# Patient Record
Sex: MALE | Race: WHITE | Employment: FULL TIME | URBAN - METROPOLITAN AREA
[De-identification: names, ages, dates, MRNs, and addresses within clinical notes are randomized per-mention and may not be internally consistent; named-entity substitution may affect disease eponyms.]

---

## 2021-04-16 ENCOUNTER — OFFICE VISIT (OUTPATIENT)
Dept: SURGERY | Age: 51
End: 2021-04-16
Payer: COMMERCIAL

## 2021-04-16 VITALS
WEIGHT: 260 LBS | HEART RATE: 90 BPM | TEMPERATURE: 98.2 F | HEIGHT: 72 IN | BODY MASS INDEX: 35.21 KG/M2 | DIASTOLIC BLOOD PRESSURE: 85 MMHG | SYSTOLIC BLOOD PRESSURE: 158 MMHG

## 2021-04-16 DIAGNOSIS — K42.9 UMBILICAL HERNIA WITHOUT OBSTRUCTION AND WITHOUT GANGRENE: Primary | ICD-10-CM

## 2021-04-16 DIAGNOSIS — Z12.11 ENCOUNTER FOR SCREENING COLONOSCOPY: ICD-10-CM

## 2021-04-16 DIAGNOSIS — Z72.0 CHEWING TOBACCO USE: ICD-10-CM

## 2021-04-16 PROCEDURE — 4004F PT TOBACCO SCREEN RCVD TLK: CPT | Performed by: SURGERY

## 2021-04-16 PROCEDURE — G8427 DOCREV CUR MEDS BY ELIG CLIN: HCPCS | Performed by: SURGERY

## 2021-04-16 PROCEDURE — 99202 OFFICE O/P NEW SF 15 MIN: CPT | Performed by: SURGERY

## 2021-04-16 PROCEDURE — 3017F COLORECTAL CA SCREEN DOC REV: CPT | Performed by: SURGERY

## 2021-04-16 PROCEDURE — G8417 CALC BMI ABV UP PARAM F/U: HCPCS | Performed by: SURGERY

## 2021-04-16 NOTE — LETTER
[facility] SURGERY Part of [place] [address]  Phone: [phone]  Fax: [phone]    Marialuisa Verma MD        April 18, 2021     [address]    Patient: Jeffery Allen  MR Number: [id number]  YOB: 1970  Date of Visit: 4/16/2021    Dear Dr. Alize Trammell: Thank you for the request for consultation for Katia Burroughs to me for the evaluation of umbilical hernia. Below are the relevant portions of my assessment and plan of care. ASSESSMENT     Diagnosis Orders   1. Umbilical hernia without obstruction and without gangrene     2. Encounter for screening colonoscopy     3. BMI 35.0-35.9,adult     4. Chewing tobacco use       PLAN    Discussed at length with  findings of umbilical hernia. We will proceed with umbilical herniorrhaphy, robotic assist, with mesh. Ideally, will plan screening colonoscopy prior to hernia repair. Risks, benefits, alternatives thoroughly reviewed and accepted by Mr. Sil Davila, including bleeding, infection, hernia recurrence, chronic pain, internal organ injury, COVID-19 exposure/infection, etc.  Additional risks of GI bleeding, perforation, missed lesions reviewed and accepted if he chooses to proceed with screening colonoscopy. Postoperative avoidance of abdominal straining and heavy lifting will be required for the first month after surgery. Patient conveys clear understanding and is in agreement. Discussed importance of tobacco cessation. If you have questions, please do not hesitate to call me. I look forward to following Fracisco Moore along with you.     Sincerely,        Marialuisa Verma MD

## 2021-04-18 ASSESSMENT — ENCOUNTER SYMPTOMS
SHORTNESS OF BREATH: 0
NAUSEA: 0
ABDOMINAL PAIN: 1
VOMITING: 0
TROUBLE SWALLOWING: 0
BACK PAIN: 0
SORE THROAT: 0
BLOOD IN STOOL: 0
CHOKING: 0
COUGH: 0

## 2021-04-18 NOTE — PATIENT INSTRUCTIONS
Patient Education        Hernia: Care Instructions  Your Care Instructions     A hernia develops when tissue bulges through a weak spot in the wall of your belly. The groin area and the navel are common areas for a hernia. A hernia can also develop near the area of a surgery you had before. Pressure from lifting, straining, or coughing can tear the weak area, causing the hernia to bulge and be painful. If you cannot push a hernia back into place, the tissue may become trapped outside the belly wall. If the hernia gets twisted and loses its blood supply, it will swell and die. This is called a strangulated hernia. It usually causes a lot of pain. It needs treatment right away. Some hernias need to be repaired to prevent a strangulated hernia. If your hernia causes symptoms or is large, you may need surgery. Follow-up care is a key part of your treatment and safety. Be sure to make and go to all appointments, and call your doctor if you are having problems. It's also a good idea to know your test results and keep a list of the medicines you take. How can you care for yourself at home? · Take care when lifting heavy objects. · Stay at a healthy weight. · Do not smoke. Smoking can cause coughing, which can cause your hernia to bulge. If you need help quitting, talk to your doctor about stop-smoking programs and medicines. These can increase your chances of quitting for good. · Talk with your doctor before wearing a corset or truss for a hernia. These devices are not recommended for treating hernias and sometimes can do more harm than good. There may be certain situations when your doctor thinks a truss would work, but these are rare. When should you call for help?    Call your doctor now or seek immediate medical care if:    · You have new or worse belly pain.     · You are vomiting.     · You cannot pass stools or gas.     · You cannot push the hernia back into place with gentle pressure when you are lying

## 2021-04-18 NOTE — PROGRESS NOTES
Vinny Logan MD  General Surgery, Endoscopy  Chief Medical Officer    [address] [address]  Dept: [phone]  Fax: 97 [address]la Cuello  Chief Complaint   Patient presents with    New Patient     umbilical hernia, patient states he noticed it about 4 months ago, denies pain. No known positve COVID, completed vaccination. HPI    Mr Thompson Chau is a pleasant 62-year-old white male kindly referred to me by Dr. Jada Farias for evaluation of umbilical hernia. He first noticed discomfort and bulging 4 months ago. No nausea, vomiting nor other signs of bowel obstruction. Prior left inguinal hernia repair in 2000. No previous umbilical hernia repair. No recent weight changes, 260 pounds, BMI 35. Normal appetite. Daily bowel movements, formed and brown, without blood. No prior abdominal surgery nor endoscopy. No cough, fever nor other respiratory symptoms. No history of COVID-19, vaccination is complete. No family history of colon cancer nor polyps to his knowledge. Patient has never smoked but uses chewing tobacco regularly. Review of Systems   Constitutional: Negative for activity change, appetite change, chills, fever and unexpected weight change. HENT: Negative for nosebleeds, sneezing, sore throat and trouble swallowing. Eyes: Negative for visual disturbance. Respiratory: Negative for cough, choking and shortness of breath. Cardiovascular: Negative for chest pain, palpitations and leg swelling. Gastrointestinal: Positive for abdominal pain (umbilical hernia site). Negative for blood in stool, nausea and vomiting. Genitourinary: Negative for dysuria, flank pain and hematuria. Musculoskeletal: Negative for back pain, gait problem and myalgias. Allergic/Immunologic: Negative for immunocompromised state. Neurological: Negative for dizziness, seizures, syncope, weakness and headaches.    Hematological: Does not bruise/bleed easily. Psychiatric/Behavioral: Negative for confusion and sleep disturbance. Past Medical History:   Diagnosis Date    Impaired fasting glucose     Mixed hyperlipidemia        Past Surgical History:   Procedure Laterality Date    HERNIA REPAIR Left 2000    INGUINAL    TONSILLECTOMY AND ADENOIDECTOMY      TYMPANOSTOMY TUBE PLACEMENT      VASECTOMY         No family history on file. Allergies:  See list    Current Outpatient Medications   Medication Sig Dispense Refill    diclofenac (VOLTAREN) 50 MG EC tablet Take 1 tablet by mouth daily as needed (migraine headache) 10 tablet 0    atorvastatin (LIPITOR) 40 MG tablet TAKE ONE TABLET BY MOUTH DAILY 90 tablet 1    fenofibrate (TRIGLIDE) 160 MG tablet Take 1 tablet by mouth daily 90 tablet 3    aspirin EC 81 MG EC tablet Take 1 tablet by mouth daily 1 tablet 0    tadalafil (CIALIS) 10 MG tablet Take 1 tablet by mouth daily as needed for Erectile Dysfunction 30 tablet 11     No current facility-administered medications for this visit.         Social History     Socioeconomic History    Marital status:      Spouse name: Not on file    Number of children: Not on file    Years of education: Not on file    Highest education level: Not on file   Occupational History    Not on file   Social Needs    Financial resource strain: Not hard at all    Food insecurity     Worry: Never true     Inability: Never true   [employer] needs     Medical: No     Non-medical: No   Tobacco Use    Smoking status: Never Smoker    Smokeless tobacco: Current User   Substance and Sexual Activity    Alcohol use: Not on file    Drug use: Not on file    Sexual activity: Not on file   Lifestyle    Physical activity     Days per week: Not on file     Minutes per session: Not on file    Stress: Not on file   Relationships    Social connections     Talks on phone: Not on file     Gets together: Not on file     Attends Christianity service: Not on file     Active member of club or organization: Not on file     Attends meetings of clubs or organizations: Not on file     Relationship status: Not on file    Intimate partner violence     Fear of current or ex partner: Not on file     Emotionally abused: Not on file     Physically abused: Not on file     Forced sexual activity: Not on file   Other Topics Concern    Not on file   Social History Narrative    Not on file       BP (!) 158/85   Pulse 90   Temp 98.2 °F (36.8 °C)   Ht 6' (1.829 m)   Wt 260 lb (117.9 kg)   BMI 35.26 kg/m²      Physical Exam  Vitals signs and nursing note reviewed. Constitutional:       Appearance: He is well-developed. HENT:      Head: Normocephalic and atraumatic. Eyes:      General: No scleral icterus. Conjunctiva/sclera: Conjunctivae normal.      Pupils: Pupils are equal, round, and reactive to light. Neck:      Musculoskeletal: Normal range of motion and neck supple. Vascular: No JVD. Trachea: No tracheal deviation. Cardiovascular:      Rate and Rhythm: Normal rate and regular rhythm. Pulmonary:      Effort: Pulmonary effort is normal. No respiratory distress. Chest:      Chest wall: No tenderness. Abdominal:      General: There is no distension. Palpations: Abdomen is soft. There is no mass. Tenderness: There is no abdominal tenderness. There is no guarding or rebound. Hernia: A hernia (umbilical) is present. Musculoskeletal: Normal range of motion. Lymphadenopathy:      Cervical: No cervical adenopathy. Skin:     General: Skin is warm and dry. Findings: No erythema or rash. Neurological:      Mental Status: He is alert and oriented to person, place, and time. Cranial Nerves: No cranial nerve deficit. Psychiatric:         Behavior: Behavior normal.         Thought Content:  Thought content normal.         Judgment: Judgment normal.         IMAGING/LABS       Hemoglobin A1C (Order [id number])  12/18/2020  5:51 PM - [facility], Lab [facility]    Component Value Ref Range & Units Status Collected Lab   Hemoglobin A1C 5.8  4.4 - 6.4 % Final 12/18/2020  7:50 AM [facility]   NOTE                    ADA Guidelines            Result                  HgbA1c         ------------           ---------------         Normal :               less than 5.7 %         Prediabetes :          5.7 %  to 6.4 %         Diabetes :             > 6.4 %   Use with caution in patients with abnormal hemoglobin variants as   the half-life of red blood cells and in vivo glycation rates are   affected. AVERAGE GLUCOSE 120  mg/dL Final 12/18/2020  7:50 AM [facility]   Performed at [address] [place] [address] Lab   [address] [facility] [address]   Pathology [address] [facility], [address] [facility]   CLIA No. [id number]   CAP Accreditation No. [id number]   : Emmie Marie. Romie Negron M.D. Testing Performed By    Lab - Abbreviation Name Director Address Valid Date Range   275[facility] Unknown Unknown 07/15/14 1010-Present   Narrative  Performed by: [facility]  Ordering Provider: Travon Curiel   Lab and Collection    Hemoglobin A1C - 12/18/2020  Result History    ASSESSMENT     Diagnosis Orders   1. Umbilical hernia without obstruction and without gangrene     2. Encounter for screening colonoscopy     3. BMI 35.0-35.9,adult     4. Chewing tobacco use       PLAN    Discussed at length with  findings of umbilical hernia. We will proceed with umbilical herniorrhaphy, robotic assist, with mesh. Ideally, will plan screening colonoscopy prior to hernia repair. Risks, benefits, alternatives thoroughly reviewed and accepted by Mr. Rudolfo Claude, including bleeding, infection, hernia recurrence, chronic pain, internal organ injury, COVID-19 exposure/infection, etc.  Additional risks of GI bleeding, perforation, missed lesions reviewed and accepted if he chooses to proceed with screening colonoscopy. Postoperative avoidance of abdominal straining and heavy lifting will be required for the first month after surgery. Patient conveys clear understanding and is in agreement. Discussed importance of tobacco cessation.      Mushtaq Rico MD

## 2021-04-26 ENCOUNTER — TELEPHONE (OUTPATIENT)
Dept: SURGERY | Age: 51
End: 2021-04-26

## 2021-04-26 NOTE — TELEPHONE ENCOUNTER
Colonoscopy canceled from 06/05/97 and umbilical hernia repair canceled from 06/23/21.  Both were scheduled with Dr Catina Millard and [facility].

## 2021-06-02 ENCOUNTER — TELEPHONE (OUTPATIENT)
Dept: SURGERY | Age: 51
End: 2021-06-02

## 2021-06-02 NOTE — TELEPHONE ENCOUNTER
Patient called stating he finally got his new insurance and would like to be rescheduled for surgery.  He can be reached at [phone]

## 2021-06-08 RX ORDER — SODIUM, POTASSIUM,MAG SULFATES 17.5-3.13G
1 SOLUTION, RECONSTITUTED, ORAL ORAL ONCE
Qty: 1 KIT | Refills: 0 | Status: SHIPPED | OUTPATIENT
Start: 2021-06-08 | End: 2021-06-08

## 2021-06-17 NOTE — PROGRESS NOTES
Patient instructed on the pre-operative, intra-operative, and post-operative process. Patient instructed on NPO status. Medication instructions and Pre operative instruction sheet reviewed over the phone. CHG skin prep instructions reviewed with the patient. Pt states he was instructed to stop taking aspirin 7 days prior to surgery. Pt states he will   e- mail a copy of his covid vaccination card to [facility] for proof of vaccination.

## 2021-06-22 ENCOUNTER — HOSPITAL ENCOUNTER (OUTPATIENT)
Age: 51
Discharge: HOME | End: 2021-06-22
Payer: COMMERCIAL

## 2021-06-22 DIAGNOSIS — Z01.818 PRE-OP TESTING: ICD-10-CM

## 2021-06-22 LAB
EKG ATRIAL RATE: 74 BPM
EKG P AXIS: 16 DEGREES
EKG P-R INTERVAL: 178 MS
EKG Q-T INTERVAL: 394 MS
EKG QRS DURATION: 90 MS
EKG QTC CALCULATION (BAZETT): 437 MS
EKG R AXIS: -8 DEGREES
EKG T AXIS: 12 DEGREES
EKG VENTRICULAR RATE: 74 BPM

## 2021-06-22 NOTE — PROGRESS NOTES
[facility]   Preadmission Testing    Name: Jr Ruby  : 1970  Patient Phone: [phone] (home)     Procedure: Colonoscopy   Date of Procedure: 21  Surgeon: Mina Acosta MD    Ht:    Wt:   Wt method: Allergies: Allergies   Allergen Reactions    Strawberry Extract            Latex Allergy Screening Tool  Have you ever had a reaction to or been told by a physician that you have an allergy to latex or natural rubber?: No    There were no vitals filed for this visit. No LMP for male patient. Do you take blood thinners? [] Yes    [x] No         Instructed to stop blood thinners prior to procedure? [] Yes    [] No      [x] N/A   Do you have sleep apnea? [] Yes    [x] No     Do you have acid reflux ? [] Yes    [x] No     Do you have  hiatal hernia? [] Yes    [x] No    Do you ever experience motion sickness? [] Yes    [x] No     Have you had a respiratory infection or sore throat in last 4 weeks before surgery? [] Yes    [x] No     Do you have poorly controlled asthma or COPD? Difficulty with intubation in past? [] Yes    [x] No      [] Yes    [x] No       Do you have a history of angina in the last month or symptomatic arrhythmia? [] Yes    [x] No     Do you have significant central nervous system disease? [] Yes    [] No     Have you had an EKG, labs, or chest xray in last 12 months? If yes provide copies to anesthesia   [x] Yes    [] No       [] Lab    [x] EKG    [] CXR     Have you had a stress test?     [] Yes    [x] No    When/where:    Was it normal?    [] Yes    [] No     Do you or your family have a history of Malignant Hyperthermia? [] Yes    [x] No           Do you smoke? [] Yes    [x] No      Please refrain from smoking on the day of surgery.       Patient instructed on: [x] NPO Status   [x] Meds to Take  [x] Ride Home  [x]No Jewelry/Contact Lenses/Nail [place]  [x] Prep/Lax/Clear Liquids    [] Chlorhexidene     DOS Patient Needs [] HCG [] Blood Sugar  [] PT/INR    [] T&S       COVID Vaccinated? [x] Yes    [] No                     Patient instructed on the pre-operative, intra-operative, and post-operative process? Yes  Medication instructions reviewed with patient?   Yes

## 2021-06-24 ENCOUNTER — ANESTHESIA (OUTPATIENT)
Dept: ENDOSCOPY | Age: 51
End: 2021-06-24
Payer: COMMERCIAL

## 2021-06-24 ENCOUNTER — ANESTHESIA EVENT (OUTPATIENT)
Dept: ENDOSCOPY | Age: 51
End: 2021-06-24
Payer: COMMERCIAL

## 2021-06-24 ENCOUNTER — HOSPITAL ENCOUNTER (OUTPATIENT)
Age: 51
Setting detail: OUTPATIENT SURGERY
Discharge: HOME | End: 2021-06-24
Attending: SURGERY | Admitting: SURGERY
Payer: COMMERCIAL

## 2021-06-24 VITALS
WEIGHT: 254.8 LBS | HEART RATE: 61 BPM | RESPIRATION RATE: 20 BRPM | TEMPERATURE: 98.9 F | DIASTOLIC BLOOD PRESSURE: 87 MMHG | HEIGHT: 72 IN | BODY MASS INDEX: 34.51 KG/M2 | SYSTOLIC BLOOD PRESSURE: 151 MMHG | OXYGEN SATURATION: 99 %

## 2021-06-24 VITALS
DIASTOLIC BLOOD PRESSURE: 71 MMHG | RESPIRATION RATE: 14 BRPM | OXYGEN SATURATION: 96 % | SYSTOLIC BLOOD PRESSURE: 109 MMHG

## 2021-06-24 DIAGNOSIS — Z01.818 PRE-OP TESTING: Primary | ICD-10-CM

## 2021-06-24 PROBLEM — Z12.11 ENCOUNTER FOR SCREENING COLONOSCOPY: Status: ACTIVE | Noted: 2021-06-24

## 2021-06-24 PROCEDURE — 6360000002 HC RX W HCPCS: Performed by: NURSE ANESTHETIST, CERTIFIED REGISTERED

## 2021-06-24 PROCEDURE — 3609027000 HC COLONOSCOPY: Performed by: SURGERY

## 2021-06-24 PROCEDURE — 7100000010 HC PHASE II RECOVERY - FIRST 15 MIN: Performed by: SURGERY

## 2021-06-24 PROCEDURE — 2709999900 HC NON-CHARGEABLE SUPPLY: Performed by: SURGERY

## 2021-06-24 PROCEDURE — 2500000003 HC RX 250 WO HCPCS: Performed by: NURSE ANESTHETIST, CERTIFIED REGISTERED

## 2021-06-24 PROCEDURE — 7100000011 HC PHASE II RECOVERY - ADDTL 15 MIN: Performed by: SURGERY

## 2021-06-24 PROCEDURE — 3700000000 HC ANESTHESIA ATTENDED CARE: Performed by: SURGERY

## 2021-06-24 PROCEDURE — 2580000003 HC RX 258: Performed by: SURGERY

## 2021-06-24 PROCEDURE — 3700000001 HC ADD 15 MINUTES (ANESTHESIA): Performed by: SURGERY

## 2021-06-24 RX ORDER — PROPOFOL 10 MG/ML
INJECTION, EMULSION INTRAVENOUS PRN
Status: DISCONTINUED | OUTPATIENT
Start: 2021-06-24 | End: 2021-06-24 | Stop reason: SDUPTHER

## 2021-06-24 RX ORDER — SODIUM CHLORIDE 0.9 % (FLUSH) 0.9 %
5-40 SYRINGE (ML) INJECTION EVERY 12 HOURS SCHEDULED
Status: DISCONTINUED | OUTPATIENT
Start: 2021-06-24 | End: 2021-06-24 | Stop reason: HOSPADM

## 2021-06-24 RX ORDER — SODIUM CHLORIDE 0.9 % (FLUSH) 0.9 %
5-40 SYRINGE (ML) INJECTION PRN
Status: DISCONTINUED | OUTPATIENT
Start: 2021-06-24 | End: 2021-06-24 | Stop reason: HOSPADM

## 2021-06-24 RX ORDER — SODIUM CHLORIDE, SODIUM LACTATE, POTASSIUM CHLORIDE, CALCIUM CHLORIDE 600; 310; 30; 20 MG/100ML; MG/100ML; MG/100ML; MG/100ML
INJECTION, SOLUTION INTRAVENOUS CONTINUOUS
Status: CANCELLED | OUTPATIENT
Start: 2021-06-24

## 2021-06-24 RX ORDER — SODIUM CHLORIDE 0.9 % (FLUSH) 0.9 %
10 SYRINGE (ML) INJECTION PRN
Status: CANCELLED | OUTPATIENT
Start: 2021-06-24

## 2021-06-24 RX ORDER — LIDOCAINE HYDROCHLORIDE 10 MG/ML
INJECTION, SOLUTION EPIDURAL; INFILTRATION; INTRACAUDAL; PERINEURAL PRN
Status: DISCONTINUED | OUTPATIENT
Start: 2021-06-24 | End: 2021-06-24 | Stop reason: SDUPTHER

## 2021-06-24 RX ORDER — SODIUM CHLORIDE, SODIUM LACTATE, POTASSIUM CHLORIDE, CALCIUM CHLORIDE 600; 310; 30; 20 MG/100ML; MG/100ML; MG/100ML; MG/100ML
INJECTION, SOLUTION INTRAVENOUS CONTINUOUS
Status: DISCONTINUED | OUTPATIENT
Start: 2021-06-24 | End: 2021-06-24 | Stop reason: HOSPADM

## 2021-06-24 RX ORDER — SODIUM CHLORIDE 0.9 % (FLUSH) 0.9 %
10 SYRINGE (ML) INJECTION EVERY 12 HOURS SCHEDULED
Status: CANCELLED | OUTPATIENT
Start: 2021-06-24

## 2021-06-24 RX ORDER — SODIUM CHLORIDE 9 MG/ML
25 INJECTION, SOLUTION INTRAVENOUS PRN
Status: CANCELLED | OUTPATIENT
Start: 2021-06-24

## 2021-06-24 RX ORDER — PROPOFOL 10 MG/ML
INJECTION, EMULSION INTRAVENOUS CONTINUOUS PRN
Status: DISCONTINUED | OUTPATIENT
Start: 2021-06-24 | End: 2021-06-24 | Stop reason: SDUPTHER

## 2021-06-24 RX ORDER — SODIUM CHLORIDE 9 MG/ML
25 INJECTION, SOLUTION INTRAVENOUS PRN
Status: DISCONTINUED | OUTPATIENT
Start: 2021-06-24 | End: 2021-06-24 | Stop reason: HOSPADM

## 2021-06-24 RX ADMIN — SODIUM CHLORIDE, POTASSIUM CHLORIDE, SODIUM LACTATE AND CALCIUM CHLORIDE: 600; 310; 30; 20 INJECTION, SOLUTION INTRAVENOUS at 11:49

## 2021-06-24 RX ADMIN — PROPOFOL 100 MCG/KG/MIN: 10 INJECTION, EMULSION INTRAVENOUS at 14:09

## 2021-06-24 RX ADMIN — PROPOFOL 100 MG: 10 INJECTION, EMULSION INTRAVENOUS at 14:08

## 2021-06-24 RX ADMIN — LIDOCAINE HYDROCHLORIDE 100 MG: 10 INJECTION, SOLUTION EPIDURAL; INFILTRATION; INTRACAUDAL; PERINEURAL at 14:09

## 2021-06-24 RX ADMIN — PROPOFOL 50 MG: 10 INJECTION, EMULSION INTRAVENOUS at 14:09

## 2021-06-24 ASSESSMENT — PAIN - FUNCTIONAL ASSESSMENT: PAIN_FUNCTIONAL_ASSESSMENT: 0-10

## 2021-06-24 ASSESSMENT — PAIN SCALES - GENERAL
PAINLEVEL_OUTOF10: 0
PAINLEVEL_OUTOF10: 0

## 2021-06-24 NOTE — OP NOTE
[place] [address]                                OPERATIVE REPORT    PATIENT NAME: Ryder Clayton                 :        1970  MED REC NO:   [id number]                              ROOM:  ACCOUNT NO:   [de-identified]                           ADMIT DATE: 2021  PROVIDER:     Miguel Valladares    DATE OF PROCEDURE:  2021    ATTENDING SURGEON:  Miguel Valladares MD    MEDICAL ATTENDING:  Hussein Ortega MD    PREOPERATIVE DIAGNOSIS:  Screening colonoscopy. POSTOPERATIVE DIAGNOSIS:  Normal colon. OPERATION:  Colonoscopy, anus to cecum. ANESTHESIA:  MAC.    ESTIMATED BLOOD LOSS:  None. INDICATIONS: The patient is a pleasant 69-year-old white male presenting  for screening colonoscopy. He is also preoped for umbilical hernia  repair. He has had no previous GI surgery. No prior endoscopy. BMI of  35. No family history of colon cancer nor polyps to his knowledge. The  patient has never smoked tobacco, but uses chewing tobacco regularly. At this time, screening colonoscopy is indicated. OPERATIVE PROCEDURE:  After obtaining informed consent with discussion  of risks, benefits, and alternatives including a risk of GI bleeding,  perforation, missed lesions, COVID-19 exposure/infection, etc., the  patient was taken to the endoscopy suite and placed in the left lateral  recumbent position. Following adequate IV sedation, a digital rectal  exam was performed. Sphincter tone was normal.  Prostate was  unremarkable. There were no discrete masses. A colonoscope was passed  transanally into the rectum and advanced with gentle insufflation  throughout the entirety of the colon to the cecum.   Cecal position was  confirmed by clear visualization of the ileocecal valve, the appendiceal  orifice, and transduction of manual pressure in the right lower quadrant  to the cecum.  The bowel prep was excellent. All colonic mucosa was  clearly visible. The cecum, ascending colon, and hepatic flexure were  normal.  Transverse colon, splenic flexure were also normal.  The  descending colon and sigmoid were normal.  Upper, middle, and lower  portions of the rectum were normal.  On retroflexion, there were minimal  grade 1 to grade 2 internal hemorrhoids. The colon was decompressed by  suction as the scope was removed. No biopsies were required. The  patient tolerated the procedure well and was transferred to PACU in  stable condition. SPECIMENS:  None. DRAINS:  None. COMPLICATIONS:  None. DISPOSITION:  To PACU, awake, alert, and stable. Following recovery, we  will discharge the patient home with gradual advancement of diet and  activity as tolerated with instructions for a healthy, balanced,  high-fiber, low-fat diet with fiber supplementation. We will encourage  tobacco cessation. Follow up will be with me after laparoscopic  umbilical hernia repair, robotic assist.  We will recommend next  screening colonoscopy in 10 years age 63 since he has no personal  history of polyps and no family history of colon cancer nor polyps to  his knowledge.         LICO Pham    D: 06/24/2021 14:27:32       T: 06/24/2021 14:35:20     [email or web address]  Job#: [id number]     Doc#: [id number]    CC:  Lalito Mathews

## 2021-06-24 NOTE — ANESTHESIA PRE PROCEDURE
History:        Procedure Laterality Date    HERNIA REPAIR Left 2000    INGUINAL    TONSILLECTOMY AND ADENOIDECTOMY      TYMPANOSTOMY TUBE PLACEMENT      VASECTOMY         Social History:    Social History     Tobacco Use    Smoking status: Never Smoker    Smokeless tobacco: Current User     Types: Chew   Substance Use Topics    Alcohol use: Yes     Comment: RARE                                Ready to quit: Not Answered  Counseling given: Not Answered      Vital Signs (Current):   Vitals:    06/24/21 1129 06/24/21 1139   BP:  137/73   Pulse:  56   Resp:  18   Temp:  37.1 °C (98.7 °F)   TempSrc:  Temporal   SpO2:  95%   Weight: 254 lb 12.8 oz (115.6 kg)    Height: 6' (1.829 m)                                               BP Readings from Last 3 Encounters:   06/24/21 137/73   04/16/21 (!) 158/85   12/20/19 131/85       NPO Status: Time of last liquid consumption: 2300                        Time of last solid consumption: 1800                        Date of last liquid consumption: 06/24/21                        Date of last solid food consumption: 06/22/21    BMI:   Wt Readings from Last 3 Encounters:   06/24/21 254 lb 12.8 oz (115.6 kg)   04/16/21 260 lb (117.9 kg)   12/20/19 265 lb (120.2 kg)     Body mass index is 34.56 kg/m². CBC:   Lab Results   Component Value Date    WBC 7.0 12/18/2020    RBC 4.64 12/18/2020    HGB 14.6 12/18/2020    HCT 43.1 12/18/2020    MCV 93 12/18/2020    RDW 13.4 12/18/2020     12/18/2020       CMP:   Lab Results   Component Value Date     12/18/2020    K 4.1 12/18/2020     12/18/2020    CO2 23 12/18/2020    BUN 18 12/18/2020    CREATININE 1.01 12/18/2020    GLUCOSE 103 12/18/2020    PROT 7.0 06/12/2020    CALCIUM 9.1 12/18/2020    BILITOT 0.6 06/12/2020    ALKPHOS 36 06/12/2020    AST 33 12/18/2020    ALT 55 12/18/2020       POC Tests: No results for input(s): POCGLU, POCNA, POCK, POCCL, POCBUN, POCHEMO, POCHCT in the last 72 hours.     Coags: No results

## 2021-06-24 NOTE — ANESTHESIA POSTPROCEDURE EVALUATION
Department of Anesthesiology  Postprocedure Note    Patient: Carie Quiles  MRN: [id number]  YOB: 1970  Date of evaluation: 6/24/2021  Time:  2:30 PM     Procedure Summary     Date: 06/24/21 Room / Location: [facility] ENDOSCOPY    Anesthesia Start: 3676 Anesthesia Stop: 6714    Procedure: COLONOSCOPY (N/A Abdomen) Diagnosis: (SCREENING COLONOSCOPY)    Surgeons: Carline Issa MD Responsible Provider: REINA Oquendo CRNA    Anesthesia Type: MAC ASA Status: 2          Anesthesia Type: MAC    Pk Phase I: Pk Score: 10    Pk Phase II:      Last vitals: Reviewed and per EMR flowsheets.        Anesthesia Post Evaluation    Patient location during evaluation: PACU  Patient participation: complete - patient participated  Level of consciousness: awake and alert  Pain score: 0  Airway patency: patent  Nausea & Vomiting: no nausea and no vomiting  Complications: no  Cardiovascular status: blood pressure returned to baseline and hemodynamically stable  Respiratory status: acceptable, room air and spontaneous ventilation  Hydration status: euvolemic

## 2021-06-24 NOTE — PROGRESS NOTES

## 2021-06-24 NOTE — BRIEF OP NOTE
Brief Postoperative Note      Patient: Dayanna Hagen  YOB: 1970  MRN: [id number]    Date of Procedure: 6/24/2021    Pre-Op Diagnosis:      1. Screening colonoscopy     Post-Op Diagnosis:      1. Normal colon       Operation:     1. Colonoscopy anus to cecum    Surgeon(s):  Mushtaq Rico MD    Assistant:  * No surgical staff found *    Anesthesia: Monitor Anesthesia Care    Estimated Blood Loss (mL):  None    Complications: None    Specimens:  None    Findings:  As above.     Dictated # [id number]    Electronically signed by Mushtaq Rico MD on 6/24/2021 at 2:22 PM

## 2021-06-24 NOTE — H&P
HPI     Mr Goldy Jennings is a pleasant 58-year-old white male kindly referred to me by Dr. Darrell Mcknight for evaluation of umbilical hernia. He first noticed discomfort and bulging 4 months ago. No nausea, vomiting nor other signs of bowel obstruction. Prior left inguinal hernia repair in 2000. No previous umbilical hernia repair. No recent weight changes, 260 pounds, BMI 35. Normal appetite. Daily bowel movements, formed and brown, without blood. No prior abdominal surgery nor endoscopy. No cough, fever nor other respiratory symptoms. No history of COVID-19, vaccination is complete. No family history of colon cancer nor polyps to his knowledge. Patient has never smoked but uses chewing tobacco regularly.     Review of Systems   Constitutional: Negative for activity change, appetite change, chills, fever and unexpected weight change. HENT: Negative for nosebleeds, sneezing, sore throat and trouble swallowing. Eyes: Negative for visual disturbance. Respiratory: Negative for cough, choking and shortness of breath. Cardiovascular: Negative for chest pain, palpitations and leg swelling. Gastrointestinal: Positive for abdominal pain (umbilical hernia site). Negative for blood in stool, nausea and vomiting. Genitourinary: Negative for dysuria, flank pain and hematuria. Musculoskeletal: Negative for back pain, gait problem and myalgias. Allergic/Immunologic: Negative for immunocompromised state. Neurological: Negative for dizziness, seizures, syncope, weakness and headaches. Hematological: Does not bruise/bleed easily.    Psychiatric/Behavioral: Negative for confusion and sleep disturbance.         Past Medical History        Past Medical History:   Diagnosis Date    Impaired fasting glucose      Mixed hyperlipidemia              Past Surgical History         Past Surgical History:   Procedure Laterality Date    HERNIA REPAIR Left 2000     INGUINAL    TONSILLECTOMY AND ADENOIDECTOMY        TYMPANOSTOMY TUBE PLACEMENT        VASECTOMY                Family History   No family history on file.        Allergies:  See list     Current Facility-Administered Medications          Current Outpatient Medications   Medication Sig Dispense Refill    diclofenac (VOLTAREN) 50 MG EC tablet Take 1 tablet by mouth daily as needed (migraine headache) 10 tablet 0    atorvastatin (LIPITOR) 40 MG tablet TAKE ONE TABLET BY MOUTH DAILY 90 tablet 1    fenofibrate (TRIGLIDE) 160 MG tablet Take 1 tablet by mouth daily 90 tablet 3    aspirin EC 81 MG EC tablet Take 1 tablet by mouth daily 1 tablet 0    tadalafil (CIALIS) 10 MG tablet Take 1 tablet by mouth daily as needed for Erectile Dysfunction 30 tablet 11      No current facility-administered medications for this visit.             Social History   Social History            Socioeconomic History    Marital status:        Spouse name: Not on file    Number of children: Not on file    Years of education: Not on file    Highest education level: Not on file   Occupational History    Not on file   Social Needs    Financial resource strain: Not hard at all   [employer]-Delia insecurity       Worry: Never true       Inability: Never true    Transportation needs       Medical: No       Non-medical: No   Tobacco Use    Smoking status: Never Smoker    Smokeless tobacco: Current User   Substance and Sexual Activity    Alcohol use: Not on file    Drug use: Not on file    Sexual activity: Not on file   Lifestyle    Physical activity       Days per week: Not on file       Minutes per session: Not on file    Stress: Not on file   Relationships    Social connections       Talks on phone: Not on file       Gets together: Not on file       Attends Jain service: Not on file       Active member of club or organization: Not on file       Attends meetings of clubs or organizations: Not on file       Relationship status: Not on file    Intimate partner violence       Fear of current or ex partner: Not on file       Emotionally abused: Not on file       Physically abused: Not on file       Forced sexual activity: Not on file   Other Topics Concern    Not on file   Social History Narrative    Not on file            BP (!) 158/85   Pulse 90   Temp 98.2 °F (36.8 °C)   Ht 6' (1.829 m)   Wt 260 lb (117.9 kg)   BMI 35.26 kg/m²       Physical Exam  Vitals signs and nursing note reviewed. Constitutional:       Appearance: He is well-developed. HENT:      Head: Normocephalic and atraumatic. Eyes:      General: No scleral icterus. Conjunctiva/sclera: Conjunctivae normal.      Pupils: Pupils are equal, round, and reactive to light. Neck:      Musculoskeletal: Normal range of motion and neck supple. Vascular: No JVD. Trachea: No tracheal deviation. Cardiovascular:      Rate and Rhythm: Normal rate and regular rhythm. Pulmonary:      Effort: Pulmonary effort is normal. No respiratory distress. Chest:      Chest wall: No tenderness. Abdominal:      General: There is no distension. Palpations: Abdomen is soft. There is no mass. Tenderness: There is no abdominal tenderness. There is no guarding or rebound. Hernia: A hernia (umbilical) is present. Musculoskeletal: Normal range of motion. Lymphadenopathy:      Cervical: No cervical adenopathy. Skin:     General: Skin is warm and dry. Findings: No erythema or rash. Neurological:      Mental Status: He is alert and oriented to person, place, and time. Cranial Nerves: No cranial nerve deficit. Psychiatric:         Behavior: Behavior normal.         Thought Content:  Thought content normal.         Judgment: Judgment normal.            IMAGING/LABS        Hemoglobin A1C (Order [id number])  12/18/2020  5:51 PM - [place], Lab [facility]     Component Value Ref Range & Units Status Collected Lab   Hemoglobin A1C 5.8  4.4 - 6.4 % Final 12/18/2020  7:50 AM [facility]   NOTE                    ADA Guidelines            Result                  HgbA1c         ------------           ---------------         Normal :               less than 5.7 %         Prediabetes :          5.7 %  to 6.4 %         Diabetes :             > 6.4 %   Use with caution in patients with abnormal hemoglobin variants as   the half-life of red blood cells and in vivo glycation rates are   affected. AVERAGE GLUCOSE 120  mg/dL Final 12/18/2020  7:50 AM [facility]   Performed at [facility]. [facility]   [address] [facility] [address]   Pathology [facility], [address] [place] [address]   CLIA No. [id number]   CAP Accreditation No. [id number]   : Kahlil Reece. Allyn Keys M.D. Testing Performed By     Lab - Abbreviation Name Director Address Valid Date Range   [facility] Unknown Unknown 07/15/14 1010-Present   Narrative  Performed by: [facility]  Ordering Provider: Ronaldo Cash and Collection     Hemoglobin A1C - 12/18/2020  Result History     ASSESSMENT       Diagnosis Orders   1. Umbilical hernia without obstruction and without gangrene      2. Encounter for screening colonoscopy      3. BMI 35.0-35.9,adult      4. Chewing tobacco use         PLAN     Previously discussed at length with  findings of umbilical hernia. We will proceed with umbilical herniorrhaphy, robotic assist, with mesh after colonoscopy. Risks, benefits, alternatives thoroughly reviewed and accepted by Mr. Pamella Galindo, including bleeding, infection, hernia recurrence, chronic pain, internal organ injury, COVID-19 exposure/infection, etc.  Additional risks of GI bleeding, perforation, missed lesions reviewed and accepted if he chooses to proceed with screening colonoscopy today. Postoperative avoidance of abdominal straining and heavy lifting will be required for the first month after surgery. Patient conveys clear understanding and is in agreement.   Discussed importance of tobacco cessation.

## 2021-06-29 ENCOUNTER — ANESTHESIA EVENT (OUTPATIENT)
Dept: OPERATING ROOM | Age: 51
End: 2021-06-29
Payer: COMMERCIAL

## 2021-06-30 ENCOUNTER — ANESTHESIA (OUTPATIENT)
Dept: OPERATING ROOM | Age: 51
End: 2021-06-30
Payer: COMMERCIAL

## 2021-06-30 ENCOUNTER — HOSPITAL ENCOUNTER (OUTPATIENT)
Age: 51
Setting detail: OUTPATIENT SURGERY
Discharge: HOME | End: 2021-06-30
Attending: SURGERY | Admitting: SURGERY
Payer: COMMERCIAL

## 2021-06-30 VITALS
WEIGHT: 241 LBS | BODY MASS INDEX: 32.64 KG/M2 | TEMPERATURE: 97.3 F | OXYGEN SATURATION: 96 % | SYSTOLIC BLOOD PRESSURE: 130 MMHG | DIASTOLIC BLOOD PRESSURE: 83 MMHG | HEIGHT: 72 IN | RESPIRATION RATE: 16 BRPM | HEART RATE: 69 BPM

## 2021-06-30 VITALS — DIASTOLIC BLOOD PRESSURE: 74 MMHG | SYSTOLIC BLOOD PRESSURE: 138 MMHG | OXYGEN SATURATION: 98 %

## 2021-06-30 DIAGNOSIS — Z87.19 S/P LAPAROSCOPIC HERNIA REPAIR: Primary | ICD-10-CM

## 2021-06-30 DIAGNOSIS — Z98.890 S/P LAPAROSCOPIC HERNIA REPAIR: Primary | ICD-10-CM

## 2021-06-30 PROBLEM — K42.9 UMBILICAL HERNIA: Status: ACTIVE | Noted: 2021-06-30

## 2021-06-30 PROCEDURE — S2900 ROBOTIC SURGICAL SYSTEM: HCPCS | Performed by: SURGERY

## 2021-06-30 PROCEDURE — 7100000000 HC PACU RECOVERY - FIRST 15 MIN: Performed by: SURGERY

## 2021-06-30 PROCEDURE — 3700000000 HC ANESTHESIA ATTENDED CARE: Performed by: SURGERY

## 2021-06-30 PROCEDURE — 3600000019 HC SURGERY ROBOT ADDTL 15MIN: Performed by: SURGERY

## 2021-06-30 PROCEDURE — 7100000001 HC PACU RECOVERY - ADDTL 15 MIN: Performed by: SURGERY

## 2021-06-30 PROCEDURE — 7100000011 HC PHASE II RECOVERY - ADDTL 15 MIN: Performed by: SURGERY

## 2021-06-30 PROCEDURE — 3600000009 HC SURGERY ROBOT BASE: Performed by: SURGERY

## 2021-06-30 PROCEDURE — 2500000003 HC RX 250 WO HCPCS: Performed by: NURSE ANESTHETIST, CERTIFIED REGISTERED

## 2021-06-30 PROCEDURE — 7100000010 HC PHASE II RECOVERY - FIRST 15 MIN: Performed by: SURGERY

## 2021-06-30 PROCEDURE — 6370000000 HC RX 637 (ALT 250 FOR IP): Performed by: SURGERY

## 2021-06-30 PROCEDURE — 3700000001 HC ADD 15 MINUTES (ANESTHESIA): Performed by: SURGERY

## 2021-06-30 PROCEDURE — 2580000003 HC RX 258: Performed by: SURGERY

## 2021-06-30 PROCEDURE — 2580000003 HC RX 258: Performed by: NURSE ANESTHETIST, CERTIFIED REGISTERED

## 2021-06-30 PROCEDURE — 6360000002 HC RX W HCPCS: Performed by: NURSE ANESTHETIST, CERTIFIED REGISTERED

## 2021-06-30 PROCEDURE — 2709999900 HC NON-CHARGEABLE SUPPLY: Performed by: SURGERY

## 2021-06-30 PROCEDURE — C1781 MESH (IMPLANTABLE): HCPCS | Performed by: SURGERY

## 2021-06-30 PROCEDURE — 64488 TAP BLOCK BI INJECTION: CPT | Performed by: NURSE ANESTHETIST, CERTIFIED REGISTERED

## 2021-06-30 PROCEDURE — 6360000002 HC RX W HCPCS: Performed by: SURGERY

## 2021-06-30 DEVICE — PATCH HERN M DIA2.5IN CIR W/ STRP SEPRA TECHNOLOGY ABSRB: Type: IMPLANTABLE DEVICE | Site: UMBILICAL | Status: FUNCTIONAL

## 2021-06-30 RX ORDER — GABAPENTIN 300 MG/1
300 CAPSULE ORAL ONCE
Status: COMPLETED | OUTPATIENT
Start: 2021-06-30 | End: 2021-06-30

## 2021-06-30 RX ORDER — SODIUM CHLORIDE, SODIUM LACTATE, POTASSIUM CHLORIDE, CALCIUM CHLORIDE 600; 310; 30; 20 MG/100ML; MG/100ML; MG/100ML; MG/100ML
INJECTION, SOLUTION INTRAVENOUS CONTINUOUS
Status: DISCONTINUED | OUTPATIENT
Start: 2021-06-30 | End: 2021-06-30 | Stop reason: HOSPADM

## 2021-06-30 RX ORDER — DEXAMETHASONE SODIUM PHOSPHATE 10 MG/ML
INJECTION, SOLUTION INTRAMUSCULAR; INTRAVENOUS PRN
Status: DISCONTINUED | OUTPATIENT
Start: 2021-06-30 | End: 2021-06-30 | Stop reason: SDUPTHER

## 2021-06-30 RX ORDER — EPHEDRINE SULFATE/0.9% NACL/PF 50 MG/5 ML
SYRINGE (ML) INTRAVENOUS PRN
Status: DISCONTINUED | OUTPATIENT
Start: 2021-06-30 | End: 2021-06-30 | Stop reason: SDUPTHER

## 2021-06-30 RX ORDER — SODIUM CHLORIDE 0.9 % (FLUSH) 0.9 %
5-40 SYRINGE (ML) INJECTION PRN
Status: DISCONTINUED | OUTPATIENT
Start: 2021-06-30 | End: 2021-06-30 | Stop reason: HOSPADM

## 2021-06-30 RX ORDER — DIMENHYDRINATE 50 MG/1
50 TABLET ORAL ONCE
Status: COMPLETED | OUTPATIENT
Start: 2021-06-30 | End: 2021-06-30

## 2021-06-30 RX ORDER — DEXAMETHASONE SODIUM PHOSPHATE 4 MG/ML
INJECTION, SOLUTION INTRA-ARTICULAR; INTRALESIONAL; INTRAMUSCULAR; INTRAVENOUS; SOFT TISSUE PRN
Status: DISCONTINUED | OUTPATIENT
Start: 2021-06-30 | End: 2021-06-30 | Stop reason: SDUPTHER

## 2021-06-30 RX ORDER — LIDOCAINE HYDROCHLORIDE 20 MG/ML
INJECTION, SOLUTION EPIDURAL; INFILTRATION; INTRACAUDAL; PERINEURAL PRN
Status: DISCONTINUED | OUTPATIENT
Start: 2021-06-30 | End: 2021-06-30 | Stop reason: SDUPTHER

## 2021-06-30 RX ORDER — SODIUM CHLORIDE 0.9 % (FLUSH) 0.9 %
10 SYRINGE (ML) INJECTION EVERY 12 HOURS SCHEDULED
Status: DISCONTINUED | OUTPATIENT
Start: 2021-06-30 | End: 2021-06-30 | Stop reason: HOSPADM

## 2021-06-30 RX ORDER — SODIUM CHLORIDE 0.9 % (FLUSH) 0.9 %
10 SYRINGE (ML) INJECTION PRN
Status: DISCONTINUED | OUTPATIENT
Start: 2021-06-30 | End: 2021-06-30 | Stop reason: HOSPADM

## 2021-06-30 RX ORDER — MIDAZOLAM HYDROCHLORIDE 1 MG/ML
INJECTION INTRAMUSCULAR; INTRAVENOUS PRN
Status: DISCONTINUED | OUTPATIENT
Start: 2021-06-30 | End: 2021-06-30 | Stop reason: SDUPTHER

## 2021-06-30 RX ORDER — GLYCOPYRROLATE 1 MG/5 ML
SYRINGE (ML) INTRAVENOUS PRN
Status: DISCONTINUED | OUTPATIENT
Start: 2021-06-30 | End: 2021-06-30 | Stop reason: SDUPTHER

## 2021-06-30 RX ORDER — GINSENG 100 MG
CAPSULE ORAL PRN
Status: DISCONTINUED | OUTPATIENT
Start: 2021-06-30 | End: 2021-06-30 | Stop reason: ALTCHOICE

## 2021-06-30 RX ORDER — HYDROCODONE BITARTRATE AND ACETAMINOPHEN 5; 325 MG/1; MG/1
1 TABLET ORAL EVERY 6 HOURS PRN
Qty: 14 TABLET | Refills: 0 | Status: SHIPPED | OUTPATIENT
Start: 2021-06-30 | End: 2021-07-03

## 2021-06-30 RX ORDER — ROCURONIUM BROMIDE 10 MG/ML
INJECTION, SOLUTION INTRAVENOUS PRN
Status: DISCONTINUED | OUTPATIENT
Start: 2021-06-30 | End: 2021-06-30 | Stop reason: SDUPTHER

## 2021-06-30 RX ORDER — PROPOFOL 10 MG/ML
INJECTION, EMULSION INTRAVENOUS PRN
Status: DISCONTINUED | OUTPATIENT
Start: 2021-06-30 | End: 2021-06-30 | Stop reason: SDUPTHER

## 2021-06-30 RX ORDER — SODIUM CHLORIDE, SODIUM LACTATE, POTASSIUM CHLORIDE, CALCIUM CHLORIDE 600; 310; 30; 20 MG/100ML; MG/100ML; MG/100ML; MG/100ML
INJECTION, SOLUTION INTRAVENOUS CONTINUOUS PRN
Status: DISCONTINUED | OUTPATIENT
Start: 2021-06-30 | End: 2021-06-30 | Stop reason: SDUPTHER

## 2021-06-30 RX ORDER — ASPIRIN 81 MG/1
81 TABLET, CHEWABLE ORAL DAILY
Status: DISCONTINUED | OUTPATIENT
Start: 2021-06-30 | End: 2021-06-30 | Stop reason: CLARIF

## 2021-06-30 RX ORDER — MORPHINE SULFATE 1 MG/ML
1 INJECTION, SOLUTION EPIDURAL; INTRATHECAL; INTRAVENOUS
Status: DISCONTINUED | OUTPATIENT
Start: 2021-06-30 | End: 2021-06-30 | Stop reason: HOSPADM

## 2021-06-30 RX ORDER — ROPIVACAINE HYDROCHLORIDE 5 MG/ML
INJECTION, SOLUTION EPIDURAL; INFILTRATION; PERINEURAL PRN
Status: DISCONTINUED | OUTPATIENT
Start: 2021-06-30 | End: 2021-06-30

## 2021-06-30 RX ORDER — SODIUM CHLORIDE 9 MG/ML
25 INJECTION, SOLUTION INTRAVENOUS PRN
Status: DISCONTINUED | OUTPATIENT
Start: 2021-06-30 | End: 2021-06-30 | Stop reason: HOSPADM

## 2021-06-30 RX ORDER — HYDROCODONE BITARTRATE AND ACETAMINOPHEN 5; 325 MG/1; MG/1
1 TABLET ORAL ONCE
Status: COMPLETED | OUTPATIENT
Start: 2021-06-30 | End: 2021-06-30

## 2021-06-30 RX ORDER — FENTANYL CITRATE 50 UG/ML
INJECTION, SOLUTION INTRAMUSCULAR; INTRAVENOUS PRN
Status: DISCONTINUED | OUTPATIENT
Start: 2021-06-30 | End: 2021-06-30 | Stop reason: SDUPTHER

## 2021-06-30 RX ORDER — ROPIVACAINE HYDROCHLORIDE 5 MG/ML
INJECTION, SOLUTION EPIDURAL; INFILTRATION; PERINEURAL PRN
Status: DISCONTINUED | OUTPATIENT
Start: 2021-06-30 | End: 2021-06-30 | Stop reason: SDUPTHER

## 2021-06-30 RX ORDER — ONDANSETRON 2 MG/ML
INJECTION INTRAMUSCULAR; INTRAVENOUS PRN
Status: DISCONTINUED | OUTPATIENT
Start: 2021-06-30 | End: 2021-06-30 | Stop reason: SDUPTHER

## 2021-06-30 RX ORDER — SODIUM CHLORIDE 0.9 % (FLUSH) 0.9 %
5-40 SYRINGE (ML) INJECTION EVERY 12 HOURS SCHEDULED
Status: DISCONTINUED | OUTPATIENT
Start: 2021-06-30 | End: 2021-06-30 | Stop reason: HOSPADM

## 2021-06-30 RX ORDER — ACETAMINOPHEN 325 MG/1
650 TABLET ORAL ONCE
Status: COMPLETED | OUTPATIENT
Start: 2021-06-30 | End: 2021-06-30

## 2021-06-30 RX ORDER — NEOSTIGMINE METHYLSULFATE 1 MG/ML
INJECTION, SOLUTION INTRAVENOUS PRN
Status: DISCONTINUED | OUTPATIENT
Start: 2021-06-30 | End: 2021-06-30 | Stop reason: SDUPTHER

## 2021-06-30 RX ORDER — SODIUM CHLORIDE 9 MG/ML
INJECTION INTRAVENOUS PRN
Status: DISCONTINUED | OUTPATIENT
Start: 2021-06-30 | End: 2021-06-30 | Stop reason: SDUPTHER

## 2021-06-30 RX ADMIN — ASPIRIN 81 MG CHEWABLE TABLET 81 MG: 81 TABLET CHEWABLE at 07:41

## 2021-06-30 RX ADMIN — PROPOFOL 200 MG: 10 INJECTION, EMULSION INTRAVENOUS at 08:28

## 2021-06-30 RX ADMIN — ONDANSETRON 4 MG: 2 INJECTION INTRAMUSCULAR; INTRAVENOUS at 09:58

## 2021-06-30 RX ADMIN — SODIUM CHLORIDE, POTASSIUM CHLORIDE, SODIUM LACTATE AND CALCIUM CHLORIDE: 600; 310; 30; 20 INJECTION, SOLUTION INTRAVENOUS at 07:09

## 2021-06-30 RX ADMIN — DEXAMETHASONE SODIUM PHOSPHATE 4 MG: 4 INJECTION, SOLUTION INTRAMUSCULAR; INTRAVENOUS at 08:39

## 2021-06-30 RX ADMIN — LIDOCAINE HYDROCHLORIDE 100 MG: 20 INJECTION, SOLUTION EPIDURAL; INFILTRATION; INTRACAUDAL; PERINEURAL at 08:28

## 2021-06-30 RX ADMIN — ACETAMINOPHEN 650 MG: 325 TABLET ORAL at 06:55

## 2021-06-30 RX ADMIN — SODIUM CHLORIDE 40 ML: 9 INJECTION, SOLUTION INTRAMUSCULAR; INTRAVENOUS; SUBCUTANEOUS at 08:00

## 2021-06-30 RX ADMIN — FENTANYL CITRATE 50 MCG: 50 INJECTION, SOLUTION INTRAMUSCULAR; INTRAVENOUS at 07:52

## 2021-06-30 RX ADMIN — CEFAZOLIN 2000 MG: 10 INJECTION, POWDER, FOR SOLUTION INTRAVENOUS at 08:40

## 2021-06-30 RX ADMIN — DEXAMETHASONE SODIUM PHOSPHATE 20 MG: 10 INJECTION, SOLUTION INTRAMUSCULAR; INTRAVENOUS at 08:00

## 2021-06-30 RX ADMIN — Medication 15 MG: at 09:10

## 2021-06-30 RX ADMIN — ROPIVACAINE HYDROCHLORIDE 50 MG: 5 INJECTION, SOLUTION EPIDURAL; INFILTRATION; PERINEURAL at 08:00

## 2021-06-30 RX ADMIN — GABAPENTIN 300 MG: 300 CAPSULE ORAL at 06:55

## 2021-06-30 RX ADMIN — HYDROCODONE BITARTRATE AND ACETAMINOPHEN 1 TABLET: 5; 325 TABLET ORAL at 11:12

## 2021-06-30 RX ADMIN — ROCURONIUM BROMIDE 50 MG: 10 INJECTION, SOLUTION INTRAVENOUS at 08:28

## 2021-06-30 RX ADMIN — SODIUM CHLORIDE, POTASSIUM CHLORIDE, SODIUM LACTATE AND CALCIUM CHLORIDE: 600; 310; 30; 20 INJECTION, SOLUTION INTRAVENOUS at 09:10

## 2021-06-30 RX ADMIN — FENTANYL CITRATE 50 MCG: 50 INJECTION, SOLUTION INTRAMUSCULAR; INTRAVENOUS at 08:28

## 2021-06-30 RX ADMIN — MIDAZOLAM 2 MG: 1 INJECTION INTRAMUSCULAR; INTRAVENOUS at 07:52

## 2021-06-30 RX ADMIN — Medication 0.4 MG: at 10:07

## 2021-06-30 RX ADMIN — DIMENHYDRINATE 50 MG: 50 TABLET ORAL at 06:55

## 2021-06-30 RX ADMIN — SODIUM CHLORIDE, POTASSIUM CHLORIDE, SODIUM LACTATE AND CALCIUM CHLORIDE: 600; 310; 30; 20 INJECTION, SOLUTION INTRAVENOUS at 08:25

## 2021-06-30 RX ADMIN — Medication 3 MG: at 10:07

## 2021-06-30 ASSESSMENT — PAIN DESCRIPTION - ONSET
ONSET: ON-GOING

## 2021-06-30 ASSESSMENT — PAIN DESCRIPTION - DESCRIPTORS
DESCRIPTORS: DISCOMFORT;PRESSURE;TIGHTNESS
DESCRIPTORS: PRESSURE;TIGHTNESS
DESCRIPTORS: PRESSURE;TIGHTNESS
DESCRIPTORS: ACHING
DESCRIPTORS: ACHING;DISCOMFORT

## 2021-06-30 ASSESSMENT — PAIN DESCRIPTION - PAIN TYPE
TYPE: SURGICAL PAIN

## 2021-06-30 ASSESSMENT — PAIN SCALES - GENERAL
PAINLEVEL_OUTOF10: 4
PAINLEVEL_OUTOF10: 2
PAINLEVEL_OUTOF10: 3
PAINLEVEL_OUTOF10: 4

## 2021-06-30 ASSESSMENT — PAIN DESCRIPTION - LOCATION
LOCATION: ABDOMEN

## 2021-06-30 ASSESSMENT — PAIN DESCRIPTION - ORIENTATION
ORIENTATION: MID;RIGHT;LEFT

## 2021-06-30 ASSESSMENT — PAIN DESCRIPTION - FREQUENCY
FREQUENCY: CONTINUOUS

## 2021-06-30 ASSESSMENT — PAIN DESCRIPTION - PROGRESSION: CLINICAL_PROGRESSION: NOT CHANGED

## 2021-06-30 NOTE — ANESTHESIA PROCEDURE NOTES
Peripheral Block    Patient location during procedure: pre-op  Start time: 6/30/2021 7:51 AM  End time: 6/30/2021 8:00 AM  Staffing  Performed: resident/CRNA   Resident/CRNA: REINA Tinsley CRNA  Other anesthesia staff: REINA Ward CRNA  Preanesthetic Checklist  Completed: patient identified, IV checked, site marked, risks and benefits discussed, surgical consent, monitors and equipment checked, pre-op evaluation, timeout performed, anesthesia consent given, oxygen available and patient being monitored  Peripheral Block  Patient position: supine  Prep: ChloraPrep  Patient monitoring: continuous pulse ox, frequent blood pressure checks and IV access  Block type: TAP  Laterality: bilateral  Injection technique: single-shot  Guidance: ultrasound guided  Local infiltration: ropivacaine and decadron (ropivacaine 0.5 25 ml + 20 ml PFNS + 10mg decadron each side)  Infiltration strength: 0.5 %  Dose: 50 mL  Provider prep: mask and sterile gloves  Local infiltration: ropivacaine and decadron (ropivacaine 0.5 25 ml + 20 ml PFNS + 10mg decadron each side)  Needle  Needle type: pajunk.    Needle gauge: 22 G  Needle length: 8 cm  Needle localization: ultrasound guidance  Assessment  Injection assessment: negative aspiration for heme and no paresthesia on injection  Paresthesia pain: none  Slow fractionated injection: yes  Hemodynamics: stable  Reason for block: post-op pain management and at surgeon's request

## 2021-06-30 NOTE — H&P
HPI:    Mr Chavez Sires a pleasant 76-year-old white male kindly referred to me by Dr. Leslie Sarkar for evaluation of umbilical hernia. [facility] first noticed discomfort and bulging 4 months ago.  No nausea, vomiting nor other signs of bowel obstruction.  Prior left inguinal hernia repair in 2000.  No previous umbilical hernia repair.  No recent weight changes, 260 pounds, BMI 35.  Normal appetite.  Daily bowel movements, formed and brown, without blood.  No prior abdominal surgery. Normal colonoscopy June 24, 2021.  No cough, fever nor other respiratory symptoms.  No history of COVID-19, vaccination is complete.  No family history of colon cancer nor polyps to his knowledge. Leodan Dejesus has never smoked but uses chewing tobacco regularly.     Review of Systems   Constitutional: Negative for activity change, appetite change, chills, fever and unexpected weight change. HENT: Negative for nosebleeds, sneezing, sore throat and trouble swallowing.    Eyes: Negative for visual disturbance. Respiratory: Negative for cough, choking and shortness of breath.    Cardiovascular: Negative for chest pain, palpitations and leg swelling. Gastrointestinal: Positive for abdominal pain (umbilical hernia site). Negative for blood in stool, nausea and vomiting. Genitourinary: Negative for dysuria, flank pain and hematuria. Musculoskeletal: Negative for back pain, gait problem and myalgias. Allergic/Immunologic: Negative for immunocompromised state. Neurological: Negative for dizziness, seizures, syncope, weakness and headaches. Hematological: Does not bruise/bleed easily.    Psychiatric/Behavioral: Negative for confusion and sleep disturbance.         Past Medical History           Past Medical History:   Diagnosis Date    Impaired fasting glucose      Mixed hyperlipidemia              Past Surgical History             Past Surgical History:   Procedure Laterality Date    HERNIA REPAIR Left 2000     INGUINAL    TONSILLECTOMY AND ADENOIDECTOMY        TYMPANOSTOMY TUBE PLACEMENT        VASECTOMY                Family History   No family history on file.         Allergies:  See list     Current Facility-Administered Medications               Current Outpatient Medications   Medication Sig Dispense Refill    diclofenac (VOLTAREN) 50 MG EC tablet Take 1 tablet by mouth daily as needed (migraine headache) 10 tablet 0    atorvastatin (LIPITOR) 40 MG tablet TAKE ONE TABLET BY MOUTH DAILY 90 tablet 1    fenofibrate (TRIGLIDE) 160 MG tablet Take 1 tablet by mouth daily 90 tablet 3    aspirin EC 81 MG EC tablet Take 1 tablet by mouth daily 1 tablet 0    tadalafil (CIALIS) 10 MG tablet Take 1 tablet by mouth daily as needed for Erectile Dysfunction 30 tablet 11      No current facility-administered medications for this visit.             Social History   Social History                Socioeconomic History    Marital status:        Spouse name: Not on file    Number of children: Not on file    Years of education: Not on file    Highest education level: Not on file   Occupational History    Not on file   Social Needs    Financial resource strain: Not hard at all   [employer] insecurity       Worry: Never true       Inability: Never true    Transportation needs       Medical: No       Non-medical: No   Tobacco Use    Smoking status: Never Smoker    Smokeless tobacco: Current User   Substance and Sexual Activity    Alcohol use: Not on file    Drug use: Not on file    Sexual activity: Not on file   Lifestyle    Physical activity       Days per week: Not on file       Minutes per session: Not on file    Stress: Not on file   Relationships    Social connections       Talks on phone: Not on file       Gets together: Not on file       Attends [employer] service: Not on file       Active member of club or organization: Not on file       Attends meetings of clubs or organizations: Not on file       Relationship status: Not on file    Intimate partner violence       Fear of current or ex partner: Not on file       Emotionally abused: Not on file       Physically abused: Not on file       Forced sexual activity: Not on file   Other Topics Concern    Not on file   Social History Narrative    Not on file            BP (!) 158/85   Pulse 90   Temp 98.2 °F (36.8 °C)   Ht 6' (1.829 m)   Wt 260 lb (117.9 kg)   BMI 35.26 kg/m²       Physical Exam  Vitals signs and nursing note reviewed. Constitutional:       Appearance: He is well-developed. HENT:      Head: Normocephalic and atraumatic. Eyes:      General: No scleral icterus.     Conjunctiva/sclera: Conjunctivae normal.      Pupils: Pupils are equal, round, and reactive to light. Neck:      Musculoskeletal: Normal range of motion and neck supple.      Vascular: No JVD.      Trachea: No tracheal deviation. Cardiovascular:      Rate and Rhythm: Normal rate and regular rhythm. Pulmonary:      Effort: Pulmonary effort is normal. No respiratory distress. Chest:      Chest wall: No tenderness. Abdominal:      General: There is no distension.      Palpations: Abdomen is soft. There is no mass.      Tenderness: There is no abdominal tenderness. There is no guarding or rebound.      Hernia: A hernia (umbilical) is present. Musculoskeletal: Normal range of motion. Lymphadenopathy:      Cervical: No cervical adenopathy. Skin:     General: Skin is warm and dry.      Findings: No erythema or rash. Neurological:      Mental Status: He is alert and oriented to person, place, and time.      Cranial Nerves: No cranial nerve deficit. Psychiatric:         BehaviorFidel Yenifer     Thought Content:  Thought content normal.         Judgment: Judgment normal.            IMAGING/LABS        Hemoglobin A1C (Order [id number])  12/18/2020  5:51 PM - [place], Lab In [facility]     Component Value Ref Range & Units Status Collected Lab   Hemoglobin A1C 5.8  4.4 - 6.4 % Final 12/18/2020  7:50 AM [facility]   NOTE                    ADA Guidelines            Result                  HgbA1c         ------------           ---------------         Normal :               less than 5.7 %         Prediabetes :          5.7 %  to 6.4 %         Diabetes :             > 6.4 %   Use with caution in patients with abnormal hemoglobin variants as   the half-life of red blood cells and in vivo glycation rates are   affected. AVERAGE GLUCOSE 120  mg/dL Final 12/18/2020  7:50 AM [facility]   Performed at [address] [place] [address]. [facility]   [address] [place], [place] [address]   Pathology [facility], [address] [facility]   CLIA No. [id number]   CAP Accreditation No. [id number]   : Eduardo Ortiz. Nadia Redman M.D. Testing Performed By     Lab - Abbreviation Name Director Address Valid Date Range   [facility] Unknown Unknown 07/15/14 1010-Present   Narrative  Performed by: [facility]  Ordering Provider: Gurwinder Cash and Collection     Hemoglobin A1C - 12/18/2020  Result History     ASSESSMENT       Diagnosis Orders   1. Umbilical hernia without obstruction and without gangrene      2. Encounter for screening colonoscopy      3. BMI 35.0-35.9,adult      4.  Chewing tobacco use         PLAN     Previously discussed at length with  findings of umbilical hernia.  We will proceed with umbilical herniorrhaphy, robotic assist, with mesh today.  Risks, benefits, alternatives thoroughly reviewed and accepted by Mr. Jose Antonio Sheffield bleeding, infection, hernia recurrence, chronic pain, internal organ injury, COVID-19 exposure/infection, etc.  Postoperative avoidance of abdominal straining and heavy lifting will be required for the first month after surgery.  Patient conveys clear understanding and is in agreement.  Discussed importance of tobacco cessation.

## 2021-06-30 NOTE — PROGRESS NOTES
Pt verbalized readiness to go home. Discharge instructions given to pt and wife. Verbalized understanding and all questions answered at this time. Discharge Criteria    Inpatients must meet Criteria 1 through 7. All other patients are either YES or N/A. If a NO is chosen then Anesthesia or Surgeon must be notified. 1.  Minimum 30 minutes after last dose of sedative medication, minimum 120 minutes after last dose of reversal agent. Yes      2. Systolic BP stable within 20 mmHg for 30 minutes & systolic BP between 90 & 489 or within 10 mmHg of baseline. Yes      3. Pulse between 60 and 100 or within 10 bpm of baseline. Yes      4. Spontaneous respiratory rate >/= 10 per minute. Yes      5. SaO2 >/= 95 or  >/= baseline. Yes      6. Able to cough and swallow or return to baseline function. Yes      7. Alert and oriented or return to baseline mental status. Yes      8. Demonstrates controlled, coordinated movements, ambulates with steady gait, or return to baseline activity function. Yes      9. Minimal or no pain or nausea, or at a level tolerable and acceptable to patient. Yes      10. Takes and retains oral fluids as allowed. Yes      11. Procedural / perioperative site stable. Minimal or no bleeding. Yes          12. If GI endoscopy procedure, minimal or no abdominal distention or passing flatus. N/A      13. Written discharge instructions and emergency telephone number provided. Yes      14. Accompanied by a responsible adult.     Yes

## 2021-06-30 NOTE — OP NOTE
[address] [place], [place] [address]                                OPERATIVE REPORT    PATIENT NAME: José Miguel Liu                 :        1970  MED REC NO:   [id number]                              ROOM:  ACCOUNT NO:   [de-identified]                           ADMIT DATE: 2021  PROVIDER:     Arsen Blakely    DATE OF PROCEDURE:  2021    ATTENDING SURGEON:  Dr. Arsen Blakely. PCP:  Rebecca Valdez MD.    PREOPERATIVE DIAGNOSIS:  Umbilical hernia, primary. POSTOPERATIVE DIAGNOSIS:  Umbilical hernia, primary. OPERATION PERFORMED:  IPOM umbilical herniorrhaphy, laparoscopic,  robotic assist, with mesh. ANESTHESIA:  General endotracheal tube. IV FLUIDS:  1 liter of crystalloid. ESTIMATED BLOOD LOSS:  Less than 20 mL. INTRAOPERATIVE FINDINGS:  As mentioned above. Primary umbilical defect  measuring approximately 2.5 cm in greatest diameter, repaired primarily  followed by onlay mesh reinforcement, IPOM technique. Good  postoperative hemostasis. INDICATIONS:  The patient is a 79-year-old white male who presents with  four months of periumbilical discomfort and bulging hernia on physical  exam, not previously repaired, prior left inguinal hernia repair in  . BMI 33. Normal colonoscopy in 2021. The patient uses chewing  tobacco regularly. At this time, laparoscopic umbilical hernia repair  is indicated. DESCRIPTION OF PROCEDURE:  After obtaining informed consent with  discussion of risks, benefits, and alternatives including a risk of  bleeding, infection, hernia recurrence, internal organ injury, COVID-19  exposure/infection, etc., the patient was taken to the operating theater  and placed in the supine position on the operating table. He received  preoperative IV antibiotics and NOHEMY sequentials.   Following smooth  induction of general anesthesia, he was positioned and prepped and  draped in the standard fashion. An incision was carried down through  the skin and subcutaneous tissues in the epigastric midline just below  the xiphoid process. It was deepened to the rectus fascia. Stay  sutures of 0 Vicryl were placed in the fascia with anterior traction  along the stay sutures. A Veress needle was passed perpendicularly into  the abdomen. Two clicks were appreciated. Saline test showed rapid  flow in the abdomen. A pneumoperitoneum was then established to 15  mmHg. The Veress needle was removed and a 12 mm balloon Visiport was  placed under laparoscopic vision directly into the abdomen. The rectus  sheath and peritoneum were clearly visualized during the course of their  dissection. Upon entry into the abdomen, all visible bowel, colon, and  omentum were normal in appearance with no evidence of needle nor trocar  injury. Three 8 mm ports were placed in the left lateral abdomen, all  under direct vision. The patient was gently flexed, placed right side  down, and the DA Silvano surgical robot was appropriately docked. Attention was directed to the umbilical defect. The peritoneum was  opened laterally to the defect and the properitoneal space was entered. This was taken to the umbilical defect where the hernia sac was  dissected from the surrounding tissues and reduced in its entirety. The  dissection continued laterally to the right side creating wide and clear  fascial margins. The fascial defect was then closed with a running 0  permanent V-Loc suture. The umbilical skin was carefully imbricated  during the closure. A medium size 6 cm Ventralex ST hernia patch was  selected. It was introduced in the abdominal cavity, appropriately  positioned, and then tacked in a chandelier fashion covering the  primarily repaired defect with greater than 2 cm margins in all  directions.   The Ventralex patch was then tacked to the surrounding  fascia in an IPOM fashion with a running 3-0 V-Loc

## 2021-06-30 NOTE — ANESTHESIA PRE PROCEDURE
Department of Anesthesiology  Preprocedure Note       Name:  Ramírez Goldman   Age:  45 y.o.  :  1970                                          MRN:  [id number]         Date:  2021      Surgeon: Lucia Rivas):  Vinny Logan MD    Procedure: Procedure(s): HERNIA UMBILICAL REPAIR LAPAROSCOPIC ROBOTIC    Medications prior to admission:   Prior to Admission medications    Medication Sig Start Date End Date Taking? Authorizing Provider   tadalafil (CIALIS) 10 MG tablet Take 1 tablet by mouth daily as needed for Erectile Dysfunction 20  Yes Macario Carter MD   diclofenac (VOLTAREN) 50 MG EC tablet Take 1 tablet by mouth daily as needed (migraine headache) 21   Macario Carter MD   atorvastatin (LIPITOR) 40 MG tablet TAKE ONE TABLET BY MOUTH DAILY  Patient taking differently: nightly  20   Macario Carter MD   fenofibrate (TRIGLIDE) 160 MG tablet Take 1 tablet by mouth daily  Patient taking differently: Take 160 mg by mouth nightly  20   Macario Carter MD   aspirin EC 81 MG EC tablet Take 1 tablet by mouth daily 18   Macario Carter MD       Current medications:    Current Facility-Administered Medications   Medication Dose Route Frequency Provider Last Rate Last Admin    lactated ringers infusion   Intravenous Continuous Vinny Logan  mL/hr at 21 0709 New Bag at 21 0709    sodium chloride flush 0.9 % injection 5-40 mL  5-40 mL Intravenous 2 times per day Vinny Logan MD        sodium chloride flush 0.9 % injection 5-40 mL  5-40 mL Intravenous PRN Vinny Logan MD        0.9 % sodium chloride infusion  25 mL Intravenous PRN Vinny Logan MD        ceFAZolin (ANCEF) 2000 mg in dextrose 5 % 100 mL IVPB  2,000 mg Intravenous On Call to [address] [place] [address] Oscar Herman MD           Allergies:     Allergies   Allergen Reactions    Strawberry Extract        Problem List:    Patient Active Problem List   Diagnosis Code    Encounter for screening colonoscopy Z12.11       Past Medical History:        Diagnosis Date    Impaired fasting glucose     Mixed hyperlipidemia        Past Surgical History:        Procedure Laterality Date    COLONOSCOPY N/A 6/24/2021    COLONOSCOPY performed by Rivka Lipscomb MD at [address]    INGUINAL    TONSILLECTOMY AND ADENOIDECTOMY      TYMPANOSTOMY TUBE PLACEMENT      VASECTOMY         Social History:    Social History     Tobacco Use    Smoking status: Never Smoker    Smokeless tobacco: Current User     Types: Chew   Substance Use Topics    Alcohol use: Yes     Comment: RARE                                Ready to quit: Not Answered  Counseling given: Not Answered      Vital Signs (Current):   Vitals:    06/17/21 0941   Weight: 250 lb (113.4 kg)   Height: 6' (1.829 m)                                              BP Readings from Last 3 Encounters:   06/24/21 (!) 151/87   06/24/21 109/71   04/16/21 (!) 158/85       NPO Status: Time of last liquid consumption: 2300                        Time of last solid consumption: 2200                        Date of last liquid consumption: 06/29/21                        Date of last solid food consumption: 06/29/21    BMI:   Wt Readings from Last 3 Encounters:   06/17/21 250 lb (113.4 kg)   06/24/21 254 lb 12.8 oz (115.6 kg)   04/16/21 260 lb (117.9 kg)     Body mass index is 33.91 kg/m².     CBC:   Lab Results   Component Value Date    WBC 7.0 12/18/2020    RBC 4.64 12/18/2020    HGB 14.6 12/18/2020    HCT 43.1 12/18/2020    MCV 93 12/18/2020    RDW 13.4 12/18/2020     12/18/2020       CMP:   Lab Results   Component Value Date     12/18/2020    K 4.1 12/18/2020     12/18/2020    CO2 23 12/18/2020    BUN 18 12/18/2020    CREATININE 1.01 12/18/2020    GLUCOSE 103 12/18/2020    PROT 7.0 06/12/2020    CALCIUM 9.1 12/18/2020    BILITOT 0.6 06/12/2020    ALKPHOS 36 06/12/2020    AST 33 12/18/2020    ALT 55 12/18/2020 Induction: intravenous. Anesthetic plan and risks discussed with patient and spouse (agree to bilateral TAP block as part of postoperative pain management).                       Lucendia Litten, APRN - CRNA   6/30/2021

## 2021-06-30 NOTE — BRIEF OP NOTE
Brief Postoperative Note      Patient: Andrea Villanueva  YOB: 1970  MRN: [id number]    Date of Procedure: 6/30/2021    Pre-Op Diagnosis:      1. Umbilical hernia, primary    Post-Op Diagnosis:      1. Umbilical hernia, primary       Operation:     1. IPOM umbilical herniorrhaphy, laparoscopic, robotic assist, with mesh    Surgeon(s):  Jose Alfredo Powers MD    Assistant:  First Assistant: Karissa Dawson    Anesthesia: General    Estimated Blood Loss (mL): less than 83UD     Complications: None    Specimens:  None    Implants:  Implant Name Type Inv. Item Serial No.  Lot No. LRB No. Used Action   PATCH VERONICA M DIA2. 5IN CIR W/ STRP [employer] ABS[employer]  PATCH VERONICA M DIA2. 5IN CIR W/ STRP [employer]  BARD DAVOL-WD [id number] N/A 1 Implanted     Findings:   As above.     Dictated # [id number]    Electronically signed by Jose Alfredo Powers MD on 6/30/2021 at 10:13 AM

## 2021-06-30 NOTE — ANESTHESIA POSTPROCEDURE EVALUATION
Department of Anesthesiology  Postprocedure Note    Patient: Loretta Pereira  MRN: [id number]  YOB: 1970  Date of evaluation: 6/30/2021  Time:  12:09 PM     Procedure Summary     Date: 06/30/21 Room / Location: [address]    Anesthesia Start: 0825 Anesthesia Stop: 5513    Procedure: HERNIA UMBILICAL REPAIR LAPAROSCOPIC ROBOTIC (N/A ) Diagnosis: (UMBILICAL HERNIA REPAIR)    Surgeons: Anu Moralez MD Responsible Provider: REINA Ortega CRNA    Anesthesia Type: general ASA Status: 3          Anesthesia Type: general    Pk Phase I: Pk Score: 10    Pk Phase II: Pk Score: 10    Last vitals: Reviewed and per EMR flowsheets.        Anesthesia Post Evaluation    Patient location during evaluation: PACU  Patient participation: complete - patient participated  Level of consciousness: awake and alert  Pain score: 4  Airway patency: patent  Nausea & Vomiting: no nausea and no vomiting  Complications: no  Cardiovascular status: blood pressure returned to baseline  Respiratory status: acceptable and room air  Hydration status: stable

## 2021-07-06 ENCOUNTER — OFFICE VISIT (OUTPATIENT)
Dept: SURGERY | Age: 51
End: 2021-07-06
Payer: COMMERCIAL

## 2021-07-06 VITALS
WEIGHT: 242 LBS | SYSTOLIC BLOOD PRESSURE: 118 MMHG | HEART RATE: 73 BPM | BODY MASS INDEX: 32.82 KG/M2 | TEMPERATURE: 98 F | DIASTOLIC BLOOD PRESSURE: 79 MMHG

## 2021-07-06 DIAGNOSIS — Z98.890 S/P COLONOSCOPY: ICD-10-CM

## 2021-07-06 DIAGNOSIS — Z72.0 CHEWING TOBACCO USE: ICD-10-CM

## 2021-07-06 DIAGNOSIS — Z87.19 S/P LAPAROSCOPIC HERNIA REPAIR: Primary | ICD-10-CM

## 2021-07-06 DIAGNOSIS — Z98.890 S/P LAPAROSCOPIC HERNIA REPAIR: Primary | ICD-10-CM

## 2021-07-06 PROCEDURE — 99024 POSTOP FOLLOW-UP VISIT: CPT | Performed by: SURGERY

## 2021-07-06 NOTE — LETTER
[facility] [place] [address]  Phone: [phone]  Fax: [phone]    Gavino Mensah MD    July 8, 2021     Candida Saul, [address] [facility] [address]    Patient: Nelli Celaya   MR Number: [id number]   YOB: 1970   Date of Visit: 7/6/2021       Dear Candida Saul: Thank you for referring Yves Jeans to me for evaluation/treatment. Below are the relevant portions of my assessment and plan of care. ASSESSMENT     Diagnosis Orders   1. S/P laparoscopic hernia repair     2. S/P colonoscopy     3. BMI 35.0-35.9,adult     4. Chewing tobacco use         PLAN    Normal colonoscopy findings reviewed with Mr. Loreli Favre. Recommend next screening colonoscopy in 5 to 10 years, age 63-63. Continue gradual advancement of diet and activity as tolerated, with no heavy lifting nor abdominal straining for the next few weeks. Discussed importance of a healthy, balanced high-fiber low-fat diet, with fiber supplementation, increased water intake, decreased calories, increase physical activity, etc.  Encouraged complete tobacco cessation. Follow-up next week for staple removal.     If you have questions, please do not hesitate to call me. I look forward to following Namrata Mitchell along with you.     Sincerely,    MD Gavino Chandler MD

## 2021-07-08 NOTE — PROGRESS NOTES
0    atorvastatin (LIPITOR) 40 MG tablet TAKE ONE TABLET BY MOUTH DAILY (Patient taking differently: nightly ) 90 tablet 1    fenofibrate (TRIGLIDE) 160 MG tablet Take 1 tablet by mouth daily (Patient taking differently: Take 160 mg by mouth nightly ) 90 tablet 3    tadalafil (CIALIS) 10 MG tablet Take 1 tablet by mouth daily as needed for Erectile Dysfunction 30 tablet 11    aspirin EC 81 MG EC tablet Take 1 tablet by mouth daily 1 tablet 0     No current facility-administered medications for this visit. Social History     Socioeconomic History    Marital status:      Spouse name: Not on file    Number of children: Not on file    Years of education: Not on file    Highest education level: Not on file   Occupational History    Not on file   Tobacco Use    Smoking status: Never Smoker    Smokeless tobacco: Current User     Types: Chew   Vaping Use    Vaping Use: Never used   Substance and Sexual Activity    Alcohol use: Yes     Comment: RARE    Drug use: Never    Sexual activity: Not on file   Other Topics Concern    Not on file   Social History Narrative    Not on file     Social Determinants of Health     Financial Resource Strain:     Difficulty of Paying Living Expenses:    Food Insecurity:     Worried About Running Out of Food in the Last Year:     [address] in the Last Year:    Transportation Needs:     Lack of Transportation (Medical):      Lack of Transportation (Non-Medical):    Physical Activity:     Days of Exercise per Week:     Minutes of Exercise per Session:    Stress:     Feeling of Stress :    Social Connections:     Frequency of Communication with Friends and Family:     Frequency of Social Gatherings with Friends and Family:     Attends [employer] Services:     Active Member of Clubs or Organizations:     Attends Club or Organization Meetings:     Marital Status:    Intimate Partner Violence:     Fear of Current or Ex-Partner:     Emotionally

## 2021-07-12 ENCOUNTER — NURSE ONLY (OUTPATIENT)
Dept: SURGERY | Age: 51
End: 2021-07-12
Payer: COMMERCIAL

## 2021-07-12 DIAGNOSIS — Z48.02 REMOVAL OF STAPLES: Primary | ICD-10-CM

## 2021-07-12 NOTE — PROGRESS NOTES
Patient returns to office for staple removal s/p lap hernia repair on 07/06/2021 performed by Dr. Travon Vitale. No s/s of infection. Staples removed from 4 sites. Wounds are clean, dry and intact. Patient without complaints. Encouraged patient to call office with any further needs.

## 2021-07-24 PROBLEM — Z12.11 ENCOUNTER FOR SCREENING COLONOSCOPY: Status: RESOLVED | Noted: 2021-06-24 | Resolved: 2021-07-24

## (undated) DEVICE — ELECTRO LUBE IS A SINGLE PATIENT USE DEVICE THAT IS INTENDED TO BE USED ON ELECTROSURGICAL ELECTRODES TO REDUCE STICKING.: Brand: KEY SURGICAL ELECTRO LUBE

## (undated) DEVICE — WARMER SCP 2 ANTIFOG LAP DISP

## (undated) DEVICE — BAG SPEC REM 224ML W4XL6IN DIA10MM 1 HND GYN DISP ENDOPCH

## (undated) DEVICE — DRESSING SURESITE-123PLUSPAD 2.4 IN X2.8 IN

## (undated) DEVICE — INTENDED FOR TISSUE SEPARATION, AND OTHER PROCEDURES THAT REQUIRE A SHARP SURGICAL BLADE TO PUNCTURE OR CUT.: Brand: BARD-PARKER ® STAINLESS STEEL BLADES

## (undated) DEVICE — PENCIL ES L3M BTTN SWCH HOLSTER W/ BLDE ELECTRD EDGE

## (undated) DEVICE — 40586 ADVANCED TRENDELENBURG POSITIONING KIT: Brand: 40586 ADVANCED TRENDELENBURG POSITIONING KIT

## (undated) DEVICE — SYRINGE MED 10ML LUERLOCK TIP W/O SFTY DISP

## (undated) DEVICE — SUTURE DEV SZ 0 L6IN N ABSORBABLE

## (undated) DEVICE — MERCY HEALTH ST CHARLES: Brand: MEDLINE INDUSTRIES, INC.

## (undated) DEVICE — BLADELESS OBTURATOR: Brand: WECK VISTA

## (undated) DEVICE — FORCEPS BX L240CM JAW DIA2.2MM RAD JAW 4 HOT DISP

## (undated) DEVICE — TIP COVER ACCESSORY

## (undated) DEVICE — MEDI-VAC NON-CONDUCTIVE SUCTION TUBING 6MM X 6.1M (20 FT.) L: Brand: CARDINAL HEALTH

## (undated) DEVICE — PLUMEPORT LAPAROSCOPIC SMOKE FILTRATION DEVICE: Brand: PLUMEPORT ACTIV

## (undated) DEVICE — SOLUTION IV IRRIG POUR BRL 0.9% SODIUM CHL 2F7124

## (undated) DEVICE — ARM DRAPE

## (undated) DEVICE — Z DISCONTINUED USE 2272114 DRAPE SURG UTIL 26X15 IN W/ TAPE N INVASIVE MULTLYR DISP

## (undated) DEVICE — GOWN,AURORA,NONRNF,XL,30/CS: Brand: MEDLINE

## (undated) DEVICE — 3M™ PRECISE™ VISTA DISPOSABLE SKIN STAPLER 3995: Brand: 3M™ PRECISE™

## (undated) DEVICE — GLOVE SURG SZ 75 L12IN FNGR THK87MIL WHT LTX FREE

## (undated) DEVICE — JELLY LUBRICATING 4OZ FLIP TOP TB E Z

## (undated) DEVICE — SUTURE SZ 0 27IN 5/8 CIR UR-6  TAPER PT VIOLET ABSRB VICRYL J603H

## (undated) DEVICE — BLADELESS OBTURATOR, LONG: Brand: WECK VISTA

## (undated) DEVICE — SUTURE V-LOC 90 3-0 L9IN ABSRB VLT L26MM V-20 1/2 CIR TAPR VLOCM0644

## (undated) DEVICE — SHEET,DRAPE,53X77,STERILE: Brand: MEDLINE

## (undated) DEVICE — CANNULA SEAL

## (undated) DEVICE — Device